# Patient Record
Sex: MALE | Race: WHITE
[De-identification: names, ages, dates, MRNs, and addresses within clinical notes are randomized per-mention and may not be internally consistent; named-entity substitution may affect disease eponyms.]

---

## 2021-07-16 ENCOUNTER — HOSPITAL ENCOUNTER (OUTPATIENT)
Dept: HOSPITAL 53 - M RAD | Age: 31
End: 2021-07-16
Attending: ORTHOPAEDIC SURGERY
Payer: MEDICAID
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
## 2021-07-16 DIAGNOSIS — S52.034A: Primary | ICD-10-CM

## 2021-07-16 NOTE — REP
INDICATION:

NONDISP FX OF OLECRAN PRO. History is displaced fracture of the olecranon process with

intra-articular extension.



COMPARISON:

No comparison radiographs..



TECHNIQUE:

Helical scanning is acquired and 2 mm at images are re-formatted.  Coronal and

sagittal MPR images are included.  The exam is a conducted with the patient in a

splint.



FINDINGS:

The study confirms a displaced comminuted transverse fracture through the olecranon

process.  There is a rectangular fragment which is rotated into the interval between

the 2 larger fragments.  This rectangular fragment measures 9 mm.  Fracture is

intra-articular.  No distal humeral fracture is appreciated.  The proximal radius

appears intact as well.  The ulnotrochlear articulation appears normally aligned

anteriorly.



IMPRESSION:

Comminuted intra-articular fracture of the olecranon process with a 8 mm rotated

fragment in the interval between the 2 larger ulnar fracture fragments.  No proximal

radial or distal humeral fracture seen.





<Electronically signed by Dale Brandt > 07/16/21 1363

## 2023-05-15 ENCOUNTER — HOSPITAL ENCOUNTER (EMERGENCY)
Dept: HOSPITAL 53 - M ED | Age: 33
Discharge: HOME | End: 2023-05-15
Payer: MEDICAID

## 2023-05-15 VITALS — SYSTOLIC BLOOD PRESSURE: 158 MMHG | DIASTOLIC BLOOD PRESSURE: 88 MMHG

## 2023-05-15 VITALS — BODY MASS INDEX: 20.23 KG/M2 | WEIGHT: 157.63 LBS | HEIGHT: 74 IN

## 2023-05-15 DIAGNOSIS — G56.31: Primary | ICD-10-CM

## 2023-05-15 DIAGNOSIS — Z79.899: ICD-10-CM

## 2023-05-15 DIAGNOSIS — F11.10: ICD-10-CM

## 2023-05-15 LAB
ALBUMIN SERPL BCG-MCNC: 4.2 G/DL (ref 3.2–5.2)
ALP SERPL-CCNC: 72 U/L (ref 46–116)
ALT SERPL W P-5'-P-CCNC: 22 U/L (ref 7–40)
AMPHETAMINES UR QL SCN: NEGATIVE
APTT BLD: 24.9 SECONDS (ref 24.8–34.2)
AST SERPL-CCNC: 21 U/L (ref ?–34)
BARBITURATES UR QL SCN: NEGATIVE
BASOPHILS # BLD AUTO: 0.1 10^3/UL (ref 0–0.2)
BASOPHILS NFR BLD AUTO: 0.5 % (ref 0–1)
BENZODIAZ UR QL SCN: NEGATIVE
BILIRUB CONJ SERPL-MCNC: 0.1 MG/DL (ref ?–0.4)
BILIRUB SERPL-MCNC: 0.3 MG/DL (ref 0.3–1.2)
BUN SERPL-MCNC: 10 MG/DL (ref 9–23)
BZE UR QL SCN: NEGATIVE
CALCIUM SERPL-MCNC: 9.4 MG/DL (ref 8.5–10.1)
CANNABINOIDS UR QL SCN: NEGATIVE
CHLORIDE SERPL-SCNC: 105 MMOL/L (ref 98–107)
CK MB CFR.DF SERPL CALC: 1.51
CK MB SERPL-MCNC: < 1 NG/ML (ref ?–3.6)
CK SERPL-CCNC: 66 U/L (ref 46–171)
CO2 SERPL-SCNC: 27 MMOL/L (ref 20–31)
CREAT SERPL-MCNC: 0.74 MG/DL (ref 0.7–1.3)
EOSINOPHIL # BLD AUTO: 0.1 10^3/UL (ref 0–0.5)
EOSINOPHIL NFR BLD AUTO: 0.6 % (ref 0–3)
ETHANOL SERPL-MCNC: 0 % (ref 0–0.01)
GFR SERPL CREATININE-BSD FRML MDRD: > 60 ML/MIN/{1.73_M2} (ref 60–?)
GLUCOSE SERPL-MCNC: 90 MG/DL (ref 60–100)
HCT VFR BLD AUTO: 45.8 % (ref 42–52)
HGB BLD-MCNC: 16.3 G/DL (ref 13.5–17.5)
INR PPP: 0.9
LYMPHOCYTES # BLD AUTO: 1.5 10^3/UL (ref 1.5–5)
LYMPHOCYTES NFR BLD AUTO: 12.3 % (ref 24–44)
MCH RBC QN AUTO: 31.4 PG (ref 27–33)
MCHC RBC AUTO-ENTMCNC: 35.6 G/DL (ref 32–36.5)
MCV RBC AUTO: 88.2 FL (ref 80–96)
METHADONE UR QL SCN: NEGATIVE
MONOCYTES # BLD AUTO: 0.7 10^3/UL (ref 0–0.8)
MONOCYTES NFR BLD AUTO: 5.8 % (ref 2–8)
NEUTROPHILS # BLD AUTO: 10 10^3/UL (ref 1.5–8.5)
NEUTROPHILS NFR BLD AUTO: 79.9 % (ref 36–66)
OPIATES UR QL SCN: NEGATIVE
PCP UR QL SCN: NEGATIVE
PLATELET # BLD AUTO: 313 10^3/UL (ref 150–450)
POTASSIUM SERPL-SCNC: 4.1 MMOL/L (ref 3.5–5.1)
PROT SERPL-MCNC: 7 G/DL (ref 5.7–8.2)
PROTHROMBIN TIME: 12.3 SECONDS (ref 12.5–14.5)
RBC # BLD AUTO: 5.19 10^6/UL (ref 4.3–6.1)
RSV RNA NPH QL NAA+PROBE: NEGATIVE
SODIUM SERPL-SCNC: 139 MMOL/L (ref 136–145)
WBC # BLD AUTO: 12.5 10^3/UL (ref 4–10)

## 2023-05-15 PROCEDURE — 93041 RHYTHM ECG TRACING: CPT

## 2023-05-15 PROCEDURE — 80048 BASIC METABOLIC PNL TOTAL CA: CPT

## 2023-05-15 PROCEDURE — 82077 ASSAY SPEC XCP UR&BREATH IA: CPT

## 2023-05-15 PROCEDURE — 96374 THER/PROPH/DIAG INJ IV PUSH: CPT

## 2023-05-15 PROCEDURE — 86618 LYME DISEASE ANTIBODY: CPT

## 2023-05-15 PROCEDURE — 80076 HEPATIC FUNCTION PANEL: CPT

## 2023-05-15 PROCEDURE — 82553 CREATINE MB FRACTION: CPT

## 2023-05-15 PROCEDURE — 70450 CT HEAD/BRAIN W/O DYE: CPT

## 2023-05-15 PROCEDURE — 85610 PROTHROMBIN TIME: CPT

## 2023-05-15 PROCEDURE — 87631 RESP VIRUS 3-5 TARGETS: CPT

## 2023-05-15 PROCEDURE — 85025 COMPLETE CBC W/AUTO DIFF WBC: CPT

## 2023-05-15 PROCEDURE — 85730 THROMBOPLASTIN TIME PARTIAL: CPT

## 2023-05-15 PROCEDURE — 82550 ASSAY OF CK (CPK): CPT

## 2023-05-15 PROCEDURE — 94760 N-INVAS EAR/PLS OXIMETRY 1: CPT

## 2023-05-15 PROCEDURE — 70551 MRI BRAIN STEM W/O DYE: CPT

## 2023-05-15 PROCEDURE — 80307 DRUG TEST PRSMV CHEM ANLYZR: CPT

## 2023-05-15 PROCEDURE — 96375 TX/PRO/DX INJ NEW DRUG ADDON: CPT

## 2023-05-15 PROCEDURE — 71045 X-RAY EXAM CHEST 1 VIEW: CPT

## 2023-05-15 PROCEDURE — 99285 EMERGENCY DEPT VISIT HI MDM: CPT

## 2023-05-15 PROCEDURE — 93005 ELECTROCARDIOGRAM TRACING: CPT

## 2023-05-15 PROCEDURE — 80047 BASIC METABLC PNL IONIZED CA: CPT
